# Patient Record
Sex: MALE | NOT HISPANIC OR LATINO
[De-identification: names, ages, dates, MRNs, and addresses within clinical notes are randomized per-mention and may not be internally consistent; named-entity substitution may affect disease eponyms.]

---

## 2017-04-05 ENCOUNTER — CHARTING TRANS (OUTPATIENT)
Dept: OTHER | Age: 32
End: 2017-04-05

## 2017-06-13 ENCOUNTER — CHARTING TRANS (OUTPATIENT)
Dept: OTHER | Age: 32
End: 2017-06-13

## 2017-10-11 PROBLEM — I10 HIGH BLOOD PRESSURE: Status: ACTIVE | Noted: 2017-10-11

## 2017-10-11 PROBLEM — K21.9 GERD (GASTROESOPHAGEAL REFLUX DISEASE): Status: ACTIVE | Noted: 2017-08-02

## 2018-11-23 ENCOUNTER — IMAGING SERVICES (OUTPATIENT)
Dept: OTHER | Age: 33
End: 2018-11-23

## 2024-03-12 ENCOUNTER — OFFICE VISIT (OUTPATIENT)
Facility: LOCATION | Age: 39
End: 2024-03-12
Payer: COMMERCIAL

## 2024-03-12 DIAGNOSIS — E04.1 THYROID NODULE: Primary | ICD-10-CM

## 2024-03-12 NOTE — PROGRESS NOTES
[facility], [facility]    Report of Consultation    Date of Consult: 3/12/2024     Reason for Consultation:   Thyroid nodule.    History of Present Illness:   Patient is a 38 year old male who is being seen for thyroid nodule.  Patient had been followed for thyroid nodules in the past and 1 recently enlarged in the isthmus.  He has no complaints referable to thyroid.  Denies fevers chills nausea or vomiting.  His mother has a history of thyroid disease but a he is unsure what that is.  He has no history of radiation exposure.  He denies dysphagia dyne aphasia otalgia.  He has had no change in voice.    Past Medical History  Past Medical History:   Diagnosis Date    Back pain 7/2017    when having an episode of heartburn/acid reflex    Chest pain 07/2017    when having an episode of heartburn/acid reflex    Indigestion 07/2017    Nausea 10/2017    Sleep disturbance 7/2017    waking up in the middle of the night with heart burn       Past Surgical History  Past Surgical History:   Procedure Laterality Date    APPENDECTOMY  10/2016       Family History  Family History   Problem Relation Age of Onset    Diabetes Father     Hypertension Father     Hypertension Mother        Social History  Pediatric History   Patient Parents    Not on file     Other Topics Concern    Not on file   Social History Narrative    Not on file           Current Medications:  Current Outpatient Medications   Medication Sig Dispense Refill    Pantoprazole 40 MG Intravenous Recon Soln          Allergies  Not on File    Review of Systems:   A comprehensive review of systems was negative.    Physical Exam:   There were no vitals taken for this visit.         Constitutional Normal Overall appearance - Normal.   Psychiatric Normal Orientation - Oriented to time, place, person & situation. Appropriate mood and affect.   Head/Face Normal Facial features -- Normal. Skull - Normal.   Eyes Normal Pupils equal ,round  yes ,react to light and accomidate   Ears Normal External Ear Right: Normal, Left: Normal. Canal - Right: Normal, Left: Normal. TM - Right: Normal, Left: Normal.   Nose Normal External Nose, Normal, Septum -Midline   Mouth/Throat Normal Lips/teeth/gums - Normal. Tonsils - Normal. Oropharynx - Normal.   Neck Exam Normal Inspection - Normal. Palpation - Normal. Parotid gland - Normal. Thyroid gland -slightly prominent.   Neurological Normal Memory - Normal. Cranial nerves - Cranial nerves II through XII grossly intact.   Nasopharynx Normal  Normal        Skin Normal Inspection - Normal.        Lymph Detail Normal Submental. Submandibular. Anterior cervical. Posterior cervical. Supraclavicular.       Results:     Laboratory Data:  No results found for: \"WBC\", \"HGB\", \"HCT\", \"PLT\", \"CREATSERUM\", \"BUN\", \"NA\", \"K\", \"CL\", \"CO2\", \"GLU\", \"CA\", \"ALB\", \"ALKPHO\", \"TP\", \"AST\", \"ALT\", \"PTT\", \"INR\", \"PTP\", \"T4F\", \"TSH\", \"TSHREFLEX\", \"SUSIE\", \"LIP\", \"GGT\", \"PSA\", \"DDIMER\", \"ESRML\", \"ESRPF\", \"CRP\", \"BNP\", \"MG\", \"PHOS\", \"TROP\", \"CK\", \"CKMB\", \"DOLLY\", \"RPR\", \"B12\", \"ETOH\", \"POCGLU\"      Imaging: [place] [facility] report.      Impression:   Thyroid nodule which 1 is over a centimeter in size.    Recommendations:  He appears low risk but would recommend fine-needle aspiration under ultrasonic guidance.  More than likely we may follow these with serial ultrasounds.  The patient understands his treatment plan.      Thank you for allowing me to participate in the care of your patient.      Rk Robison MD  3/12/2024

## 2024-03-19 ENCOUNTER — HOSPITAL ENCOUNTER (OUTPATIENT)
Dept: ULTRASOUND IMAGING | Facility: HOSPITAL | Age: 39
Discharge: HOME | End: 2024-03-19
Attending: OTOLARYNGOLOGY
Payer: COMMERCIAL

## 2024-03-19 DIAGNOSIS — E04.1 THYROID NODULE: ICD-10-CM

## 2024-03-19 PROCEDURE — 88173 CYTOPATH EVAL FNA REPORT: CPT | Performed by: OTOLARYNGOLOGY

## 2024-03-19 PROCEDURE — 10005 FNA BX W/US GDN 1ST LES: CPT | Performed by: OTOLARYNGOLOGY

## 2024-03-26 ENCOUNTER — TELEPHONE (OUTPATIENT)
Facility: LOCATION | Age: 39
End: 2024-03-26

## 2024-09-27 ENCOUNTER — APPOINTMENT (OUTPATIENT)
Dept: GENERAL RADIOLOGY | Age: 39
End: 2024-09-27
Attending: NURSE PRACTITIONER
Payer: COMMERCIAL

## 2024-09-27 ENCOUNTER — HOSPITAL ENCOUNTER (OUTPATIENT)
Age: 39
Discharge: HOME | End: 2024-09-27
Payer: COMMERCIAL

## 2024-09-27 VITALS
SYSTOLIC BLOOD PRESSURE: 143 MMHG | TEMPERATURE: 98 F | DIASTOLIC BLOOD PRESSURE: 95 MMHG | HEART RATE: 89 BPM | OXYGEN SATURATION: 99 % | RESPIRATION RATE: 20 BRPM

## 2024-09-27 DIAGNOSIS — S90.812A ABRASION OF LEFT FOOT, INITIAL ENCOUNTER: ICD-10-CM

## 2024-09-27 DIAGNOSIS — S90.32XA CONTUSION OF LEFT FOOT, INITIAL ENCOUNTER: ICD-10-CM

## 2024-09-27 DIAGNOSIS — M79.672 LEFT FOOT PAIN: Primary | ICD-10-CM

## 2024-09-27 PROCEDURE — 99203 OFFICE O/P NEW LOW 30 MIN: CPT | Performed by: NURSE PRACTITIONER

## 2024-09-27 PROCEDURE — 73630 X-RAY EXAM OF FOOT: CPT | Performed by: NURSE PRACTITIONER

## 2024-09-27 RX ORDER — LISINOPRIL 20 MG/1
20 TABLET ORAL DAILY
COMMUNITY
Start: 2024-09-07

## 2024-09-27 NOTE — DISCHARGE INSTRUCTIONS
May clean the wound area with soap and water and pat dry.  Use an over-the-counter topical antibiotic like Neosporin or bacitracin to prevent infection.  May let air dry, or cover with dry dressing such as gauze or Band-Aid.  Rest, ice, compression, elevation over the next 24 to 48 hours.  Over-the-counter Motrin/Tylenol as needed for pain.

## 2024-09-27 NOTE — ED PROVIDER NOTES
Patient Seen in: [facility]      History     Chief Complaint   Patient presents with    Leg or Foot Injury     Stated Complaint: foot injury    Subjective:   39-year-old male, with pain and swelling to the top of the left foot.  States it happened prior to arrival.  He accidentally dropped a 15 pound object.  Patient states that it came off right away.  He does have a small abrasion to the top of the left foot.  States his tetanus vaccination is up-to-date.            Objective:   Past Medical History:    Back pain    when having an episode of heartburn/acid reflex    Chest pain    when having an episode of heartburn/acid reflex    Indigestion    Nausea    Sleep disturbance    waking up in the middle of the night with heart burn              Past Surgical History:   Procedure Laterality Date    Appendectomy  10/2016                No pertinent social history.            Review of Systems   Musculoskeletal:         Left foot injury   All other systems reviewed and are negative.      Positive for stated Chief Complaint: Leg or Foot Injury    Other systems are as noted in HPI.  Constitutional and vital signs reviewed.      All other systems reviewed and negative except as noted above.    Physical Exam     ED Triage Vitals [09/27/24 1429]   BP (!) 143/95   Pulse 89   Resp 20   Temp 98.4 °F (36.9 °C)   Temp src Temporal   SpO2 99 %   O2 Device None (Room air)       Current Vitals:   Vital Signs  BP: (!) 143/95  Pulse: 89  Resp: 20  Temp: 98.4 °F (36.9 °C)  Temp src: Temporal    Oxygen Therapy  SpO2: 99 %  O2 Device: None (Room air)            Physical Exam  Vitals and nursing note reviewed.   Constitutional:       Appearance: Normal appearance. He is not ill-appearing, toxic-appearing or diaphoretic.   Musculoskeletal:      Left lower leg: Normal.      Left ankle: Normal.      Left Achilles Tendon: Normal. No tenderness.      Left foot: Normal range of motion and normal capillary refill. Swelling and  tenderness present. No deformity or bony tenderness. Normal pulse.   Skin:     Capillary Refill: Capillary refill takes less than 2 seconds.   Neurological:      General: No focal deficit present.      Mental Status: He is alert.   Psychiatric:         Mood and Affect: Mood normal.               ED Course   Labs Reviewed - No data to display  XR FOOT, COMPLETE (MIN 3 VIEWS), LEFT (CPT=73630)    Result Date: 9/27/2024  PROCEDURE:  XR FOOT, COMPLETE (MIN 3 VIEWS), LEFT (CPT=73630)  TECHNIQUE:  AP, oblique, and lateral views were obtained.  COMPARISON:  None.  INDICATIONS:  Status post left foot injury due to dropping a heavy object on the left foot earlier today.  PATIENT STATED HISTORY: (As transcribed by Technologist)  The patient dropped a heavy object on his left foot today.    FINDINGS:  BONES:  Normal.  No significant arthropathy or acute abnormality. SOFT TISSUES:  There is mild soft tissue swelling along the dorsum of the foot. EFFUSION:  None visible. OTHER:  Negative.            CONCLUSION:  1. Mild soft tissue swelling along the dorsum of the left foot. 2. No acute osseous injury/fractures are identified.   LOCATION:  [facility]   Dictated by (CST): Carla Molina DO on 9/27/2024 at 3:13 PM     Finalized by (CST): Carla Molina DO on 9/27/2024 at 3:14 PM                       [facility]                                         Medical Decision Making  39-year-old male, with a left foot injury.  No neurovascular deficits.  Differential include but not limited to contusion versus fracture.  Will obtain x-ray.  Also has a small abrasion to the top of the left foot, which staff will irrigate, and dressed with antibiotic ointment and gauze or Band-Aid dressing.  Wound care instructions provided.  I personally viewed, independently interpreted and radiology report was reviewed.  No fracture.  Likely contusion.    Supportive/home management of diagnosis/illness/injury discussed. Red flag symptoms discussed.  Signs and  symptoms/criteria that would necessitate reevaluation, including ER evaluation discussed.  Patient and/or responsible adult verbalize and agree with management and plan of care.    Speech recognition software was used during this dictation.  There may be minor errors in transcription.      Amount and/or Complexity of Data Reviewed  Radiology: ordered and independent interpretation performed. Decision-making details documented in ED Course.    Risk  OTC drugs.        Disposition and Plan     Clinical Impression:  1. Left foot pain    2. Abrasion of left foot, initial encounter    3. Contusion of left foot, initial encounter         Disposition:  Discharge  9/27/2024  3:20 pm    Follow-up:  No follow-up provider specified.        Medications Prescribed:  Current Discharge Medication List

## 2025-01-13 PROBLEM — K82.8 BILIARY DYSKINESIA: Status: ACTIVE | Noted: 2017-11-27

## 2025-01-13 PROBLEM — R10.13 EPIGASTRIC PAIN: Status: ACTIVE | Noted: 2017-11-27

## 2025-01-17 PROBLEM — R10.13 ABDOMINAL PAIN, EPIGASTRIC: Status: ACTIVE | Noted: 2025-01-17
